# Patient Record
Sex: FEMALE
[De-identification: names, ages, dates, MRNs, and addresses within clinical notes are randomized per-mention and may not be internally consistent; named-entity substitution may affect disease eponyms.]

---
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
## 2023-03-03 ENCOUNTER — APPOINTMENT (OUTPATIENT)
Dept: PEDIATRICS | Facility: CLINIC | Age: 18
End: 2023-03-03
Payer: MEDICAID

## 2023-03-03 VITALS — TEMPERATURE: 97.6 F | WEIGHT: 117 LBS

## 2023-03-03 DIAGNOSIS — Z13.30 ENCOUNTER FOR SCREENING EXAM FOR MENTAL HEALTH AND BEHAVIORAL DISORDERS,UNSPEC: ICD-10-CM

## 2023-03-03 DIAGNOSIS — Z13.31 ENCOUNTER FOR SCREENING FOR DEPRESSION: ICD-10-CM

## 2023-03-03 DIAGNOSIS — Z11.3 ENCOUNTER FOR SCREENING FOR INFECTIONS WITH A PREDOMINANTLY SEXUAL MODE OF TRANSMISSION: ICD-10-CM

## 2023-03-03 DIAGNOSIS — L20.9 ATOPIC DERMATITIS, UNSPECIFIED: ICD-10-CM

## 2023-03-03 DIAGNOSIS — Z13.89 ENCOUNTER FOR SCREENING FOR OTHER DISORDER: ICD-10-CM

## 2023-03-03 PROCEDURE — 99213 OFFICE O/P EST LOW 20 MIN: CPT

## 2023-03-03 RX ORDER — TRIAMCINOLONE ACETONIDE 0.25 MG/G
0.03 OINTMENT TOPICAL
Qty: 1 | Refills: 2 | Status: ACTIVE | COMMUNITY
Start: 2022-07-07 | End: 1900-01-01

## 2023-03-03 NOTE — HISTORY OF PRESENT ILLNESS
[de-identified] : REFILL FOR CREAM  [FreeTextEntry6] : 16 yo F presenting for worsening eczema of her chest and abdomen, in setting of running out of eczema creams. She used daily moisturizer and unscented soaps/detergent. No changes in diet. Needs refill. No recent signs of illness or other concerns today.

## 2023-03-03 NOTE — DISCUSSION/SUMMARY
[FreeTextEntry1] : 16 yo F presenting for eczema.\par \par Recommend daily moisturizer and topical steroid prn for atopic dermatitis.\par Advised that next well should be w/ an adult provider

## 2023-03-03 NOTE — PHYSICAL EXAM
[NL] : moves all extremities x4, warm, well perfused x4 [Warm] : warm [Erythematous] : erythematous [Hyperpigmented] : hyperpigmented [Trunk] : trunk

## 2024-11-30 ENCOUNTER — EMERGENCY (EMERGENCY)
Facility: HOSPITAL | Age: 19
LOS: 1 days | Discharge: HOME | End: 2024-11-30
Attending: EMERGENCY MEDICINE | Admitting: EMERGENCY MEDICINE
Payer: MEDICAID

## 2024-11-30 VITALS
TEMPERATURE: 98 F | RESPIRATION RATE: 16 BRPM | DIASTOLIC BLOOD PRESSURE: 75 MMHG | HEART RATE: 88 BPM | HEIGHT: 62 IN | SYSTOLIC BLOOD PRESSURE: 114 MMHG | OXYGEN SATURATION: 100 % | WEIGHT: 138.89 LBS

## 2024-11-30 VITALS
SYSTOLIC BLOOD PRESSURE: 127 MMHG | RESPIRATION RATE: 18 BRPM | OXYGEN SATURATION: 100 % | TEMPERATURE: 98 F | DIASTOLIC BLOOD PRESSURE: 87 MMHG | HEART RATE: 78 BPM

## 2024-11-30 LAB
ALBUMIN SERPL ELPH-MCNC: 4.3 G/DL — SIGNIFICANT CHANGE UP (ref 3.3–5)
ALP SERPL-CCNC: 92 U/L — SIGNIFICANT CHANGE UP (ref 40–120)
ALT FLD-CCNC: 9 U/L — SIGNIFICANT CHANGE UP (ref 4–33)
ANION GAP SERPL CALC-SCNC: 17 MMOL/L — HIGH (ref 7–14)
APTT BLD: 25.8 SEC — SIGNIFICANT CHANGE UP (ref 24.5–35.6)
AST SERPL-CCNC: 16 U/L — SIGNIFICANT CHANGE UP (ref 4–32)
BASOPHILS # BLD AUTO: 0.06 K/UL — SIGNIFICANT CHANGE UP (ref 0–0.2)
BASOPHILS NFR BLD AUTO: 1.4 % — SIGNIFICANT CHANGE UP (ref 0–2)
BILIRUB SERPL-MCNC: <0.2 MG/DL — SIGNIFICANT CHANGE UP (ref 0.2–1.2)
BLD GP AB SCN SERPL QL: NEGATIVE — SIGNIFICANT CHANGE UP
BUN SERPL-MCNC: 11 MG/DL — SIGNIFICANT CHANGE UP (ref 7–23)
CALCIUM SERPL-MCNC: 9 MG/DL — SIGNIFICANT CHANGE UP (ref 8.4–10.5)
CHLORIDE SERPL-SCNC: 102 MMOL/L — SIGNIFICANT CHANGE UP (ref 98–107)
CO2 SERPL-SCNC: 18 MMOL/L — LOW (ref 22–31)
CREAT SERPL-MCNC: 0.54 MG/DL — SIGNIFICANT CHANGE UP (ref 0.5–1.3)
EGFR: 135 ML/MIN/1.73M2 — SIGNIFICANT CHANGE UP
EOSINOPHIL # BLD AUTO: 0.21 K/UL — SIGNIFICANT CHANGE UP (ref 0–0.5)
EOSINOPHIL NFR BLD AUTO: 5 % — SIGNIFICANT CHANGE UP (ref 0–6)
GLUCOSE SERPL-MCNC: 81 MG/DL — SIGNIFICANT CHANGE UP (ref 70–99)
HCG SERPL-ACNC: 2.3 MIU/ML — SIGNIFICANT CHANGE UP
HCT VFR BLD CALC: 31 % — LOW (ref 34.5–45)
HGB BLD-MCNC: 9.7 G/DL — LOW (ref 11.5–15.5)
IANC: 1.57 K/UL — LOW (ref 1.8–7.4)
IMM GRANULOCYTES NFR BLD AUTO: 0 % — SIGNIFICANT CHANGE UP (ref 0–0.9)
INR BLD: 1 RATIO — SIGNIFICANT CHANGE UP (ref 0.85–1.16)
LYMPHOCYTES # BLD AUTO: 1.99 K/UL — SIGNIFICANT CHANGE UP (ref 1–3.3)
LYMPHOCYTES # BLD AUTO: 47.8 % — HIGH (ref 13–44)
MCHC RBC-ENTMCNC: 26.8 PG — LOW (ref 27–34)
MCHC RBC-ENTMCNC: 31.3 G/DL — LOW (ref 32–36)
MCV RBC AUTO: 85.6 FL — SIGNIFICANT CHANGE UP (ref 80–100)
MONOCYTES # BLD AUTO: 0.33 K/UL — SIGNIFICANT CHANGE UP (ref 0–0.9)
MONOCYTES NFR BLD AUTO: 7.9 % — SIGNIFICANT CHANGE UP (ref 2–14)
NEUTROPHILS # BLD AUTO: 1.57 K/UL — LOW (ref 1.8–7.4)
NEUTROPHILS NFR BLD AUTO: 37.9 % — LOW (ref 43–77)
NRBC # BLD: 0 /100 WBCS — SIGNIFICANT CHANGE UP (ref 0–0)
NRBC # FLD: 0 K/UL — SIGNIFICANT CHANGE UP (ref 0–0)
PLATELET # BLD AUTO: 475 K/UL — HIGH (ref 150–400)
POTASSIUM SERPL-MCNC: 4.2 MMOL/L — SIGNIFICANT CHANGE UP (ref 3.5–5.3)
POTASSIUM SERPL-SCNC: 4.2 MMOL/L — SIGNIFICANT CHANGE UP (ref 3.5–5.3)
PROT SERPL-MCNC: 6.9 G/DL — SIGNIFICANT CHANGE UP (ref 6–8.3)
PROTHROM AB SERPL-ACNC: 11.9 SEC — SIGNIFICANT CHANGE UP (ref 9.9–13.4)
RBC # BLD: 3.62 M/UL — LOW (ref 3.8–5.2)
RBC # FLD: 14.5 % — SIGNIFICANT CHANGE UP (ref 10.3–14.5)
RH IG SCN BLD-IMP: POSITIVE — SIGNIFICANT CHANGE UP
SODIUM SERPL-SCNC: 137 MMOL/L — SIGNIFICANT CHANGE UP (ref 135–145)
WBC # BLD: 4.16 K/UL — SIGNIFICANT CHANGE UP (ref 3.8–10.5)
WBC # FLD AUTO: 4.16 K/UL — SIGNIFICANT CHANGE UP (ref 3.8–10.5)

## 2024-11-30 PROCEDURE — 76830 TRANSVAGINAL US NON-OB: CPT | Mod: 26

## 2024-11-30 PROCEDURE — 99285 EMERGENCY DEPT VISIT HI MDM: CPT

## 2024-11-30 PROCEDURE — 99283 EMERGENCY DEPT VISIT LOW MDM: CPT

## 2024-11-30 NOTE — ED ADULT TRIAGE NOTE - CHIEF COMPLAINT QUOTE
Pt arrives to ED s/p vaginal delivery 3 weeks ago.  Pt reports she c/o vaginal bleeding with blood clots starting tonight with cramping.  Pt denies prior medical problems.

## 2024-11-30 NOTE — ED PROVIDER NOTE - PROGRESS NOTE DETAILS
Maria C HOUSTON: Patient in sign out. 18 female with recent spontaneous vaginal delivery 25 days ago now presenting with vaginal bleeding and lower abdominal cramps.  Denies signs symptoms of anemia, is hemodynamically stable.  Hemoglobin at 9.7, pending rest of the labs as well as ultrasound. Patient updated regarding plan. Reji Patel MD PGY3: US c/f RPOC. Gyn paged. Dw pt. Reji Patel MD PGY3: consulted gyn, will see pt. Reji Patel MD PGY3: US c/f RPOC. Gyn paged. Dw pt. Remains with no s/s anemia at this time, no large volume bleeding while here. Reji Patel MD PGY3: OBGYN seen pt. Likely return of menses, rather than RPOC. Rec dc with follow up outpt with her OB. Discussed with patient all results including any incidentals. Discussed discharge, follow up, return precautions, medications. Patient verbalizes understanding and is amenable at this time. Answered patient questions.

## 2024-11-30 NOTE — ED ADULT NURSE REASSESSMENT NOTE - NS ED NURSE REASSESS COMMENT FT1
Received pt after handoff in stable condition. Respiration even and non-labored.  No complaints made at this time. Pt made comfortable. All safety precautions maintained.

## 2024-11-30 NOTE — CONSULT NOTE ADULT - ASSESSMENT
- Patient to follow up with her private OB at [facility] this week for BP check and to monitor bleeding      d/w Dr. Hernandez Moralez, PGY-2 A/P: Patient is 18y  s/p  on , per pt uncomplicated antepartum and postpartum course, now p/w vaginal bleeding and lower abdominal cramps. Reports she used two pads at home. Is not breast feeding. Reports she had normal postpartum lochia for two weeks then stopped, this is her first time bleeding in about a week. VSS. H/H 9.7/31.0, patient unsure of baseline. On exam, spotting on pad no uterine tenderness. No active bleeding. On TVUS, endometrium measures 6 mm and is slightly heterogeneous with questionable vascularity near the fundus, concerning for retained products of conception. On review, low concern for retained POC. This bleeding is likely patient's first menses since delivery. Patient overall hemodynamically stable, with minimal bleeding on exam.  - No acute GYN intervention  - Patient to follow up with her private OB at [facility] this week for BP check and to monitor bleeding  - Patient cleared for discharge from GYN perspective, remainder of management per ED    d/w Dr. Sarkis Moralez, PGY-2

## 2024-11-30 NOTE — CONSULT NOTE ADULT - SUBJECTIVE AND OBJECTIVE BOX
MALA PARK  18y  Female [id number]    HPI:        Name of GYN Physician:     POB:      Pgyn: Denies fibroids, cysts, endometriosis, STI's, Abnormal pap smears     Home meds:     Hospital Meds:   MEDICATIONS  (STANDING):    MEDICATIONS  (PRN):      Allergies    peanuts (Anaphylaxis)  Drug Allergies Not Recorded    Intolerances        PAST MEDICAL & SURGICAL HISTORY:      FAMILY HISTORY:      Social History:  Denies smoking use, drug use, alcohol use.   +occasional social alcohol use    Vital Signs Last 24 Hrs  T(C): 36.9 (30 Nov 2024 08:56), Max: 36.9 (30 Nov 2024 08:56)  T(F): 98.5 (30 Nov 2024 08:56), Max: 98.5 (30 Nov 2024 08:56)  HR: 78 (30 Nov 2024 08:56) (78 - 88)  BP: 127/87 (30 Nov 2024 08:56) (114/75 - 127/87)  BP(mean): --  RR: 18 (30 Nov 2024 08:56) (16 - 18)  SpO2: 100% (30 Nov 2024 08:56) (100% - 100%)    Parameters below as of 30 Nov 2024 08:56  Patient On (Oxygen Delivery Method): room air        Physical Exam:   General: sitting comfortably in bed, NAD   CV: RR S1S2 no m/r/g  Lungs: CTA b/l, good air flow b/l   Back: No CVA tenderness  Abd: Soft, non-tender, non-distended.  Bowel sounds present.    :  No bleeding on pad.    External labia wnl.  Bimanual exam with no cervical motion tenderness, uterus wnl, adnexa non palpable b/l.  Cervix closed vs. Cervix dilated    cm   Speculum Exam: No active bleeding from os.  Physiologic discharge.    Ext: non-tender b/l, no edema     LABS:                              9.7    4.16  )-----------( 475      ( 30 Nov 2024 05:48 )             31.0     11-30    137  |  102  |  11  ----------------------------<  81  4.2   |  18[L]  |  0.54    Ca    9.0      30 Nov 2024 05:48    TPro  6.9  /  Alb  4.3  /  TBili  <0.2  /  DBili  x   /  AST  16  /  ALT  9   /  AlkPhos  92  11-30    I&O's Detail    PT/INR - ( 30 Nov 2024 05:48 )   PT: 11.9 sec;   INR: 1.00 ratio         PTT - ( 30 Nov 2024 05:48 )  PTT:25.8 sec  Urinalysis Basic - ( 30 Nov 2024 05:48 )    Color: x / Appearance: x / SG: x / pH: x  Gluc: 81 mg/dL / Ketone: x  / Bili: x / Urobili: x   Blood: x / Protein: x / Nitrite: x   Leuk Esterase: x / RBC: x / WBC x   Sq Epi: x / Non Sq Epi: x / Bacteria: x        RADIOLOGY & ADDITIONAL STUDIES: MALA PARK  18y  Female [id number]    HPI:    Name of GYN Physician:    POB: pLTFLORA    Pgyn: Denies fibroids, cysts, endometriosis, STI's, Abnormal pap smears     Home meds:     Hospital Meds:   MEDICATIONS  (STANDING):    MEDICATIONS  (PRN):      Allergies    peanuts (Anaphylaxis)  Drug Allergies Not Recorded    Intolerances        PAST MEDICAL & SURGICAL HISTORY:      FAMILY HISTORY:      Social History:  Denies smoking use, drug use, alcohol use.   +occasional social alcohol use    Vital Signs Last 24 Hrs  T(C): 36.9 (30 Nov 2024 08:56), Max: 36.9 (30 Nov 2024 08:56)  T(F): 98.5 (30 Nov 2024 08:56), Max: 98.5 (30 Nov 2024 08:56)  HR: 78 (30 Nov 2024 08:56) (78 - 88)  BP: 127/87 (30 Nov 2024 08:56) (114/75 - 127/87)  BP(mean): --  RR: 18 (30 Nov 2024 08:56) (16 - 18)  SpO2: 100% (30 Nov 2024 08:56) (100% - 100%)    Parameters below as of 30 Nov 2024 08:56  Patient On (Oxygen Delivery Method): room air        Physical Exam:   General: sitting comfortably in bed, NAD   CV: RR S1S2 no m/r/g  Lungs: CTA b/l, good air flow b/l   Back: No CVA tenderness  Abd: Soft, non-tender, non-distended.  Bowel sounds present.    :  No bleeding on pad.    External labia wnl.  Bimanual exam with no cervical motion tenderness, uterus wnl, adnexa non palpable b/l.  Cervix closed vs. Cervix dilated    cm   Speculum Exam: No active bleeding from os.  Physiologic discharge.    Ext: non-tender b/l, no edema     LABS:                              9.7    4.16  )-----------( 475      ( 30 Nov 2024 05:48 )             31.0     11-30    137  |  102  |  11  ----------------------------<  81  4.2   |  18[L]  |  0.54    Ca    9.0      30 Nov 2024 05:48    TPro  6.9  /  Alb  4.3  /  TBili  <0.2  /  DBili  x   /  AST  16  /  ALT  9   /  AlkPhos  92  11-30    I&O's Detail    PT/INR - ( 30 Nov 2024 05:48 )   PT: 11.9 sec;   INR: 1.00 ratio         PTT - ( 30 Nov 2024 05:48 )  PTT:25.8 sec  Urinalysis Basic - ( 30 Nov 2024 05:48 )    Color: x / Appearance: x / SG: x / pH: x  Gluc: 81 mg/dL / Ketone: x  / Bili: x / Urobili: x   Blood: x / Protein: x / Nitrite: x   Leuk Esterase: x / RBC: x / WBC x   Sq Epi: x / Non Sq Epi: x / Bacteria: x        RADIOLOGY & ADDITIONAL STUDIES: MALA PARK  18y  Female [id number]    HPI: Patient is 18y  s/p  on , per pt uncomplicated antepartum and postpartum course, now p/w vaginal bleeding and lower abdominal cramps. Reports she used two pads at home. Is not breast feeding. Reports she had normal postpartum lochia for two weeks then stopped, this is her first time bleeding in about a week. Denies any chest pain, SOB, lightheadedness, dizziness.    Name of GYN Physician: Johana Maharaj [place]    POB:     Pgyn: Denies fibroids, cysts, endometriosis, STI's, Abnormal pap smears   PMH: denies  PSH: denies  Meds: none  All: NKDA        Vital Signs Last 24 Hrs  T(C): 36.9 (2024 08:56), Max: 36.9 (2024 08:56)  T(F): 98.5 (2024 08:56), Max: 98.5 (2024 08:56)  HR: 78 (2024 08:56) (78 - 88)  BP: 127/87 (2024 08:56) (114/75 - 127/87)  BP(mean): --  RR: 18 (2024 08:56) (16 - 18)  SpO2: 100% (2024 08:56) (100% - 100%)    Parameters below as of 2024 08:56  Patient On (Oxygen Delivery Method): room air        Physical Exam:   General: sitting comfortably in bed, NAD   CV: RR   Lungs: Normal respiratory effort on room air  Abd: Soft, non-tender, non-distended.    : Spotting on pad, has only changed once since coming to ED. Bimanual exam with no cervical motion tenderness, uterus wnl, adnexa non palpable b/l. Cervix closed.  Speculum Exam: No active bleeding from os. Physiologic discharge. Perineal incision well healing.  Ext: non-tender b/l, no edema     LABS:                              9.7    4.16  )-----------( 475      ( 2024 05:48 )             31.0     11-    137  |  102  |  11  ----------------------------<  81  4.2   |  18[L]  |  0.54    Ca    9.0      2024 05:48    TPro  6.9  /  Alb  4.3  /  TBili  <0.2  /  DBili  x   /  AST  16  /  ALT  9   /  AlkPhos  92      I&O's Detail    PT/INR - ( 2024 05:48 )   PT: 11.9 sec;   INR: 1.00 ratio         PTT - ( 2024 05:48 )  PTT:25.8 sec  Urinalysis Basic - ( 2024 05:48 )    Color: x / Appearance: x / SG: x / pH: x  Gluc: 81 mg/dL / Ketone: x  / Bili: x / Urobili: x   Blood: x / Protein: x / Nitrite: x   Leuk Esterase: x / RBC: x / WBC x   Sq Epi: x / Non Sq Epi: x / Bacteria: x        RADIOLOGY & ADDITIONAL STUDIES:  < from: US Transvaginal (24 @ 07:58) >    ACC: [id number] EXAM:  US TRANSVAGINAL   ORDERED BY: CHARLEE CALLOWAY     PROCEDURE DATE:  2024          INTERPRETATION:  CLINICAL INFORMATION: Rule out retained products of   conception. 3 weeks postpartum with vaginal bleeding    COMPARISON: None available.    TECHNIQUE:  Endovaginal pelvic sonogram as per order. Transabdominal pelvic sonogram   was also performed as part of our standard protocol. Color and Spectral   Doppler was performed.    FINDINGS:  Uterus: 11.7 cm x 5.2 cm x 6.8 cm. Heterogeneous material within the   endocervical canal.  Endometrium: 6 mm. Slightly heterogeneous with questionable vascularity   near the fundus.    Ovaries could only be visualized transabdominally due to patient   discomfort.  Right ovary: 3.7 cm x 1.8 cm x 3.2 cm. Within normal limits. Normal   arterial and venous waveforms.  Left ovary: 3.1 cm x 2.0 cm x 1.8 cm. Within normal limits. Normal   arterial and venous waveforms.    Fluid: Trace.    IMPRESSION:  Technically limited exam.    Endometrium measures 6 mm and is slightly heterogeneous with questionable   vascularity near the fundus, concerning for retained products of   conception.    --- End of Report ---            ELADIO GARCIA MD; Attending Radiologist  This document has been electronically signed. 2024  8:21AM    < end of copied text >

## 2024-11-30 NOTE — ED PROVIDER NOTE - CLINICAL SUMMARY MEDICAL DECISION MAKING FREE TEXT BOX
Hossein Nguyen, PGY3 - This is a 18-year-old  normal vaginal delivery without any complications on  which is about 25 days prior to presentation presenting today with heavier than usual vaginal bleeding starting about 4 to 5 hours prior to presentation passing clots which is not her usual menstrual cycle.  Reports that she saturating about double pads every 15 to 20 minutes.  Denies any lightheadedness or fatigue or feeling like passing out.  No fever or chills.  No abdominal pain just cramping.  Vaginal delivery lasted about 6 minutes.  No complication reported.  Vitals within normal limits.  Physical exam shows well-appearing female not in acute distress.  No signs of pallor noted.  No respiratory distress.  Abdomen soft and nontender.  Concern at this time for retraining productive conception and anemia in setting of heavy bleeding however this is very low suspicion at this time due to patient appearing very well and no complication with the delivery itself.  This may be her normal menstrual period starting again.  Will obtain CBC CMP coags hCG type and screen.  Will obtain transvaginal ultrasound to rule out retained products of conception.  Disposition pending labs imaging and reassessment.

## 2024-11-30 NOTE — CONSULT NOTE ADULT - ATTENDING COMMENTS
Pt discussed with resident. Agree with findings and plan as documented.     17yo  3w4d pp s/p  on  presenting with vaginal bleeding/abdominal cramps. Bleeding stable at this time, possibly related to restarting menses. Elevated diastolic BP noted. No prior hx of abnormal BPs per pt. Recommend close follow up with OBGYN for routine pp care as well as follow up of blood pressures. Ok for dc from GYN perspective.     GAVIN Dockery MD

## 2024-11-30 NOTE — ED PROVIDER NOTE - NSFOLLOWUPINSTRUCTIONS_ED_ALL_ED_FT
You are seen in the emergency department tonight for vaginal bleeding 3 weeks after giving birth    We obtained a transvaginal ultrasound which did not show any retained products of conception meaning nothing was left behind    We obtained blood test which did not show that you required any blood transfusions as you were not losing enough blood to require a transfusion today    Your symptoms and your bleeding is most likely related to the return of your normal menstrual cycle after giving birth    Most women started having irregular periods after giving birth and within a few months will begin to have normal menstrual cycles as they did prior to being pregnant and giving birth     we recommend you follow-up with your OB/GYN to make sure you are getting better not worse.  The results from today's emergency department visit are attached to these discharge instructions so when you do see your OB/GYN please make sure you bring these labs to them    If you start having continued vaginal bleeding with a heavier flow leading you to require multiple pads per hour or you start feeling faint or lightheaded or having chest pain come back to the hospital for further evaluation at that time You are seen in the emergency department tonight for vaginal bleeding 3 weeks after giving birth    We obtained blood test which did not show that you required any blood transfusions as you were not losing enough blood to require a transfusion today    Based on your Ultrasound and OBGYN evaluation, Your symptoms and your bleeding is most likely related to the return of your normal menstrual cycle after giving birth    Most women started having irregular periods after giving birth and within a few months will begin to have normal menstrual cycles as they did prior to being pregnant and giving birth     we recommend you follow-up with your OB/GYN within 1 week  to make sure you are getting better not worse.  The results from today's emergency department visit are attached to these discharge instructions so when you do see your OB/GYN please make sure you bring these labs to them    If you start having continued vaginal bleeding with a heavier flow leading you to require multiple pads per hour or you start feeling faint or lightheaded or having chest pain come back to the hospital for further evaluation at that time

## 2024-11-30 NOTE — ED PROVIDER NOTE - ATTENDING CONTRIBUTION TO CARE
18-year-old female G1, P1 with normal vaginal delivery at full-term 3 weeks prior to ED arrival that presents with heavier than usual vaginal bleeding starting 4 to 5 hours prior to coming to the hospital.  Patient states that she is not on any blood thinners but is passing clots.  She is not sure if this is her usual menstrual cycle.  States that she is having some pelvic cramping which is similar to her previous menstrual period.  She is not breast-feeding at this time.  Denies any other symptoms such as lightheadedness, palpitations, chest pain, shortness of breath, falls, trauma, weakness.  Has been tolerating p.o. and having normal bowel movements.  No vaginal discharge otherwise and no trauma.    Well-appearing nonacute distress pelvic is deferred abdomen is otherwise soft nontender gait is steady and stable no gross musculoskeletal deformities vital signs are within normal limits nontachycardic    18-year-old G1, P1 status post  without any complications 3 weeks ago that presents with vaginal bleeding for 4 to 5 hours only going through 2 pads per this time.  Without any systemic symptoms of acute blood loss.  Vital signs are noted to be normal but at this time the concern is for either retained products or the return of her normal menstrual period as patient is not breast-feeding.  She appears well will obtain labs and transvaginal ultrasound to rule out retained products and reassess. 18-year-old female G1, P1 with normal vaginal delivery at full-term 3 weeks prior (Delivery at [facility]) to ED arrival that presents with heavier than usual vaginal bleeding starting 4 to 5 hours prior to coming to the hospital.  Patient states that she is not on any blood thinners but is passing clots.  She is not sure if this is her usual menstrual cycle.  States that she is having some pelvic cramping which is similar to her previous menstrual period.  She is not breast-feeding at this time.  Denies any other symptoms such as lightheadedness, palpitations, chest pain, shortness of breath, falls, trauma, weakness.  Has been tolerating p.o. and having normal bowel movements.  No vaginal discharge otherwise and no trauma.    Well-appearing nonacute distress pelvic is deferred abdomen is otherwise soft nontender gait is steady and stable no gross musculoskeletal deformities vital signs are within normal limits nontachycardic    18-year-old G1, P1 status post  without any complications 3 weeks ago that presents with vaginal bleeding for 4 to 5 hours only going through 2 pads per this time.  Without any systemic symptoms of acute blood loss.  Vital signs are noted to be normal but at this time the concern is for either retained products or the return of her normal menstrual period as patient is not breast-feeding.  She appears well will obtain labs and transvaginal ultrasound to rule out retained products and reassess.

## 2024-11-30 NOTE — ED PROVIDER NOTE - PATIENT PORTAL LINK FT
You can access the FollowMyHealth Patient Portal offered by [facility] by registering at the following website: [email or web address]. By joining [employer]’s FollowMyHealth portal, you will also be able to view your health information using other applications (apps) compatible with our system.

## 2024-11-30 NOTE — ED ADULT NURSE REASSESSMENT NOTE - NS ED NURSE REASSESS COMMENT FT1
Pt received from break RN, A&Ox4, resting comfortably in stretcher. NSR on cardiac monitor. Respirations even and unlabored. NAD noted. Pt offers no new complaints at this time. Comfort measures provided, safety maintained. Plan of care ongoing. Pt pending US.

## 2024-11-30 NOTE — ED ADULT NURSE NOTE - NSFALLUNIVINTERV_ED_ALL_ED
Bed/Stretcher in lowest position, wheels locked, appropriate side rails in place/Call bell, personal items and telephone in reach/Instruct patient to call for assistance before getting out of bed/chair/stretcher/Non-slip footwear applied when patient is off stretcher/[place] to call system/Physically safe environment - no spills, clutter or unnecessary equipment/Purposeful proactive rounding/Room/bathroom lighting operational, light cord in reach

## 2024-11-30 NOTE — ED ADULT NURSE NOTE - OBJECTIVE STATEMENT
PATRICK RN. Pt is A&Ox4 and ambulatory at baseline. Pt had a vaginal delivery x3 weeks ago. Pt presents to ED c/o vaginal bleeding since 1 AM. Pt reports passing blood clots. Pt endorses lower abdominal cramps. Respirations are equal and unlabored bilaterally. Abdomen is soft and non distended. Pt is not diaphoretic. #20G IV placed to the left AC. Labs drawn and sent as ordered. Denies HA, lightheadedness, dizziness, N/V/D, fever, chills, SOB, CP. Bed is in the lowest position and safety maintained.

## 2025-07-01 NOTE — HISTORY OF PRESENT ILLNESS
[de-identified] : RASH. Quality 47: Advance Care Plan: Advance Care Planning discussed and documented in the medical record; patient did not wish or was not able to name a surrogate decision maker or provide an advance care plan. [FreeTextEntry6] : 14 yo F here by herself (MOC in car outside) with flare in eczema, requesting treatment. Hx of eczema, rx'd mometasone in past by previous PMD. Occurs usually on hands, trunk, flexor surfaces. Otherwise well, afebrile.\par \par Discussed birth control - patient sexually active, intermittent condom use. Moving to [place] soon. [facility] or with [facility] in [place] for continuity. Quality 226: Preventive Care And Screening: Tobacco Use: Screening And Cessation Intervention: Patient screened for tobacco use and is an ex/non-smoker Detail Level: Zone